# Patient Record
Sex: FEMALE | Race: BLACK OR AFRICAN AMERICAN
[De-identification: names, ages, dates, MRNs, and addresses within clinical notes are randomized per-mention and may not be internally consistent; named-entity substitution may affect disease eponyms.]

---

## 2019-04-30 ENCOUNTER — HOSPITAL ENCOUNTER (EMERGENCY)
Dept: HOSPITAL 72 - EMR | Age: 19
Discharge: HOME | End: 2019-04-30
Payer: COMMERCIAL

## 2019-04-30 VITALS — DIASTOLIC BLOOD PRESSURE: 71 MMHG | SYSTOLIC BLOOD PRESSURE: 117 MMHG

## 2019-04-30 VITALS — BODY MASS INDEX: 30.36 KG/M2 | WEIGHT: 165 LBS | HEIGHT: 62 IN

## 2019-04-30 DIAGNOSIS — Z20.2: Primary | ICD-10-CM

## 2019-04-30 LAB
APPEARANCE UR: CLEAR
APTT PPP: YELLOW S
GLUCOSE UR STRIP-MCNC: NEGATIVE MG/DL
KETONES UR QL STRIP: NEGATIVE
LEUKOCYTE ESTERASE UR QL STRIP: (no result)
NITRITE UR QL STRIP: NEGATIVE
PH UR STRIP: 6.5 [PH] (ref 4.5–8)
PROT UR QL STRIP: (no result)
SP GR UR STRIP: 1.02 (ref 1–1.03)
UROBILINOGEN UR-MCNC: 1 MG/DL (ref 0–1)

## 2019-04-30 PROCEDURE — 81003 URINALYSIS AUTO W/O SCOPE: CPT

## 2019-04-30 PROCEDURE — 81025 URINE PREGNANCY TEST: CPT

## 2019-04-30 PROCEDURE — 87590 N.GONORRHOEAE DNA DIR PROB: CPT

## 2019-04-30 PROCEDURE — 99284 EMERGENCY DEPT VISIT MOD MDM: CPT

## 2019-04-30 PROCEDURE — 87491 CHLMYD TRACH DNA AMP PROBE: CPT

## 2019-04-30 PROCEDURE — 87086 URINE CULTURE/COLONY COUNT: CPT

## 2019-04-30 PROCEDURE — 96374 THER/PROPH/DIAG INJ IV PUSH: CPT

## 2019-04-30 PROCEDURE — 96372 THER/PROPH/DIAG INJ SC/IM: CPT

## 2019-04-30 NOTE — EMERGENCY ROOM REPORT
History of Present Illness


General


Chief Complaint:  General Complaint


Source:  Patient





Present Illness


HPI


17-year-old female presents to the emergency department for closure/contact 

with venereal disease.  Patient reports that her boyfriend is currently being 

treated for STI.  Patient denies vaginal discharge, dysuria, hematuria, pain 

with intercourse, suspicion of pregnancy, fevers, chills, abdominal pain, 

swollen tender lymph nodes or focal joints.  She denies past history of STI's.  

No aggravating or relieving factors.


Allergies:  


Coded Allergies:  


     No Known Allergies (Unverified , 4/30/19)





Patient History


Past Medical History:  see triage record


Past Surgical History:  none


Pertinent Family History:  none


Pregnant Now:  No


Reviewed Nursing Documentation:  PMH: Agreed; PSxH: Agreed





Nursing Documentation-PMH


Past Medical History:  No Stated History





Review of Systems


All Other Systems:  negative except mentioned in HPI





Physical Exam





Vital Signs








  Date Time  Temp Pulse Resp B/P (MAP) Pulse Ox O2 Delivery O2 Flow Rate FiO2


 


4/30/19 19:11 98.1 67 16 114/72 97 Room Air  








Sp02 EP Interpretation:  reviewed, normal


General Appearance:  no apparent distress, alert, GCS 15, non-toxic


Head:  normocephalic, atraumatic


Eyes:  bilateral eye normal inspection, bilateral eye PERRL


ENT:  hearing grossly normal, normal voice


Neck:  full range of motion


Respiratory:  lungs clear, normal breath sounds, speaking full sentences


Cardiovascular #1:  regular rate, rhythm


Gastrointestinal:  normal bowel sounds, non tender, soft, non-distended


Genitourinary:  normal inspection, no CVA tenderness, deferred - pevic deferred


Musculoskeletal:  back normal, gait/station normal, normal range of motion, non-

tender


Neurologic:  alert, oriented x3, responsive, motor strength/tone normal, 

sensory intact, speech normal, grossly normal


Psychiatric:  judgement/insight normal


Skin:  normal color, no rash, warm/dry, well hydrated


Lymphatic:  no adenopathy





Medical Decision Making


PA Attestation


Dr. Boyle is my supervising Physician whom patient management has been 

discussed with.


Diagnostic Impression:  


 Primary Impression:  


 Contact with or exposure to venereal diseases


ER Course


17-year-old female presents to the emergency department for closure/contact 

with venereal disease.  Patient reports that her boyfriend is currently being 

treated for STI.  Patient denies vaginal discharge, dysuria, hematuria, pain 

with intercourse, suspicion of pregnancy, fevers, chills, abdominal pain, 

swollen tender lymph nodes or focal joints.  She denies past history of STI's.  

No aggravating or relieving factors.





Ddx considered but are not limited to UTi , Pyelo, STI, Stone, Cystitis, 

vaginal laceration, vaginitis.


Vital signs: are WNL, pt. is afebrile 





H& PE are most consistent with:  Vaginitis 


 


ORDERS:


- UA labs are attached  -- elevated inflammatory markers and presence of 

moderate bacteria consistent with UTI. --  C& S=  No growth. 








ED INTERVENTIONS:





DISCHARGE: At this time pt. is stable for d/c to home. Will provide printed 

patient care instructions, and any necessary prescriptions. Care plan and 

follow up instructions have been discussed with the patient prior to discharge. 

discussed with the patient prior to discharge.





Labs








Test


  4/30/19


19:47


 


Urine Color Yellow 


 


Urine Appearance Clear 


 


Urine pH 6.5 (4.5-8.0) 


 


Urine Specific Gravity


  1.020


(1.005-1.035)


 


Urine Protein 1+ (NEGATIVE) 


 


Urine Glucose (UA)


  Negative


(NEGATIVE)


 


Urine Ketones


  Negative


(NEGATIVE)


 


Urine Blood 2+ (NEGATIVE) 


 


Urine Nitrite


  Negative


(NEGATIVE)


 


Urine Bilirubin


  Negative


(NEGATIVE)


 


Urine Urobilinogen


  1 MG/DL


(0.0-1.0)


 


Urine Leukocyte Esterase 3+ (NEGATIVE) 


 


Urine RBC


  5-10 /HPF (0 -


2)


 


Urine WBC


  10-15 /HPF (0


- 2)


 


Urine Squamous Epithelial


Cells Moderate /LPF


(NONE/OCC)


 


Urine Bacteria


  Moderate /HPF


(NONE)


 


Urine HCG, Qualitative


  Negative


(NEGATIVE)











Last Vital Signs








  Date Time  Temp Pulse Resp B/P (MAP) Pulse Ox O2 Delivery O2 Flow Rate FiO2


 


4/30/19 19:11 98.1 67 16 114/72 97 Room Air  








Status:  improved


Disposition:  HOME, SELF-CARE


Condition:  Stable


Patient Instructions:  Chlamydia, Female, Easy-to-Read, Gonorrhea





Additional Instructions:  


Take medications as directed. 





 ** Follow up with a Primary Care Provider in 3-5 days, even if your symptoms 

have resolved. ** 


--Please review list of primary care clinics, if you do not already have a 

primary care provider





Return sooner to ED if new symptoms occur, or current symptoms become worse. 








- Please note that this Emergency Department Report was dictated using "[employer]" technology software, occasionally this can lead to 

erroneous entry secondary to interpretation by the dictation equipment.











Lakshmi Stroud Apr 30, 2019 20:13

## 2019-04-30 NOTE — NUR
ER DISCHARGE NOTE:

Patient is cleared to be discharged per Lakshmi Stroud/ PA. Pt is aox4 on room 
air with stable vital signs. Pt was given dc and prescription instructions and 
was able to verbalize understanding.

 Pt's ID band removed. pt is d/c from ED with steady gait and pt took all 
belongings.

## 2019-04-30 NOTE — NUR
ED Nurse Note:

 Pt arrived ED from home, c/o to R/o Sex trasmitted disease. Pt is A/O X4. 
Vital signs stable at this time, waiting for orders.

## 2020-06-22 ENCOUNTER — HOSPITAL ENCOUNTER (EMERGENCY)
Dept: HOSPITAL 72 - EMR | Age: 20
Discharge: HOME | End: 2020-06-22
Payer: MEDICAID

## 2020-06-22 VITALS — DIASTOLIC BLOOD PRESSURE: 76 MMHG | SYSTOLIC BLOOD PRESSURE: 127 MMHG

## 2020-06-22 VITALS — DIASTOLIC BLOOD PRESSURE: 70 MMHG | SYSTOLIC BLOOD PRESSURE: 135 MMHG

## 2020-06-22 VITALS — HEIGHT: 63 IN | WEIGHT: 148 LBS | BODY MASS INDEX: 26.22 KG/M2

## 2020-06-22 DIAGNOSIS — F12.90: ICD-10-CM

## 2020-06-22 DIAGNOSIS — O21.9: ICD-10-CM

## 2020-06-22 DIAGNOSIS — R10.9: ICD-10-CM

## 2020-06-22 DIAGNOSIS — O23.42: Primary | ICD-10-CM

## 2020-06-22 DIAGNOSIS — Z3A.14: ICD-10-CM

## 2020-06-22 LAB
ADD MANUAL DIFF: NO
ALBUMIN SERPL-MCNC: 3.6 G/DL (ref 3.4–5)
ALBUMIN/GLOB SERPL: 1 {RATIO} (ref 1–2.7)
ALP SERPL-CCNC: 74 U/L (ref 46–116)
ALT SERPL-CCNC: 15 U/L (ref 12–78)
ANION GAP SERPL CALC-SCNC: 9 MMOL/L (ref 5–15)
APPEARANCE UR: (no result)
APTT PPP: (no result) S
AST SERPL-CCNC: 14 U/L (ref 15–37)
BASOPHILS NFR BLD AUTO: 1 % (ref 0–2)
BILIRUB SERPL-MCNC: 0.1 MG/DL (ref 0.2–1)
BUN SERPL-MCNC: 8 MG/DL (ref 7–18)
CALCIUM SERPL-MCNC: 8.7 MG/DL (ref 8.5–10.1)
CHLORIDE SERPL-SCNC: 102 MMOL/L (ref 98–107)
CO2 SERPL-SCNC: 23 MMOL/L (ref 21–32)
CREAT SERPL-MCNC: 0.6 MG/DL (ref 0.55–1.3)
EOSINOPHIL NFR BLD AUTO: 0.7 % (ref 0–3)
ERYTHROCYTE [DISTWIDTH] IN BLOOD BY AUTOMATED COUNT: 12.8 % (ref 11.6–14.8)
GLOBULIN SER-MCNC: 3.6 G/DL
GLUCOSE UR STRIP-MCNC: NEGATIVE MG/DL
HCT VFR BLD CALC: 37.1 % (ref 37–47)
HGB BLD-MCNC: 12.1 G/DL (ref 12–16)
INR PPP: 1 (ref 0.9–1.1)
KETONES UR QL STRIP: NEGATIVE
LEUKOCYTE ESTERASE UR QL STRIP: (no result)
LYMPHOCYTES NFR BLD AUTO: 16.8 % (ref 20–45)
MCV RBC AUTO: 92 FL (ref 80–99)
MONOCYTES NFR BLD AUTO: 5.6 % (ref 1–10)
NEUTROPHILS NFR BLD AUTO: 76 % (ref 45–75)
NITRITE UR QL STRIP: NEGATIVE
PH UR STRIP: 7 [PH] (ref 4.5–8)
PLATELET # BLD: 239 K/UL (ref 150–450)
POTASSIUM SERPL-SCNC: 3.4 MMOL/L (ref 3.5–5.1)
PROT UR QL STRIP: (no result)
RBC # BLD AUTO: 4.04 M/UL (ref 4.2–5.4)
SODIUM SERPL-SCNC: 134 MMOL/L (ref 136–145)
SP GR UR STRIP: 1.02 (ref 1–1.03)
UROBILINOGEN UR-MCNC: NORMAL MG/DL (ref 0–1)
WBC # BLD AUTO: 13.6 K/UL (ref 4.8–10.8)

## 2020-06-22 PROCEDURE — 96374 THER/PROPH/DIAG INJ IV PUSH: CPT

## 2020-06-22 PROCEDURE — 81025 URINE PREGNANCY TEST: CPT

## 2020-06-22 PROCEDURE — 83690 ASSAY OF LIPASE: CPT

## 2020-06-22 PROCEDURE — 93005 ELECTROCARDIOGRAM TRACING: CPT

## 2020-06-22 PROCEDURE — 76817 TRANSVAGINAL US OBSTETRIC: CPT

## 2020-06-22 PROCEDURE — 85025 COMPLETE CBC W/AUTO DIFF WBC: CPT

## 2020-06-22 PROCEDURE — 36415 COLL VENOUS BLD VENIPUNCTURE: CPT

## 2020-06-22 PROCEDURE — 96361 HYDRATE IV INFUSION ADD-ON: CPT

## 2020-06-22 PROCEDURE — 84702 CHORIONIC GONADOTROPIN TEST: CPT

## 2020-06-22 PROCEDURE — 99284 EMERGENCY DEPT VISIT MOD MDM: CPT

## 2020-06-22 PROCEDURE — 80053 COMPREHEN METABOLIC PANEL: CPT

## 2020-06-22 PROCEDURE — 81003 URINALYSIS AUTO W/O SCOPE: CPT

## 2020-06-22 PROCEDURE — 71045 X-RAY EXAM CHEST 1 VIEW: CPT

## 2020-06-22 PROCEDURE — 87086 URINE CULTURE/COLONY COUNT: CPT

## 2020-06-22 PROCEDURE — 85610 PROTHROMBIN TIME: CPT

## 2020-06-22 PROCEDURE — 96375 TX/PRO/DX INJ NEW DRUG ADDON: CPT

## 2020-06-22 PROCEDURE — 76801 OB US < 14 WKS SINGLE FETUS: CPT

## 2020-06-22 NOTE — DIAGNOSTIC IMAGING REPORT
EXAM:

  US First Trimester Pregnancy, Transabdominal and Transvaginal

 

CLINICAL HISTORY:

  PAIN

 

TECHNIQUE:

  Real-time transabdominal and transvaginal obstetrical ultrasound of the 

maternal pelvis and a first trimester pregnancy with image documentation. 

 Transvaginal imaging was used for better evaluation of the fetus and 

adnexa.

 

COMPARISON:

  No relevant prior studies available.

 

FINDINGS:

  Gestation:  Ultrasound dates and estimated gestational age using LMP 

appear to be potentially discrepant, or this fetus could be 

constitutionally small in size.  Recommend attention on follow-up.  

Estimated gestational age using LMP 14 weeks 3 days.  Estimated 

gestational age using ultrasound biometrics 14 weeks 0 days  Fetal heart 

rate 154 bpm

  SID:  Estimated SID using LMP 12/18/2020  Estimated SID using 

ultrasound biometrics 12/21/2020

  EFW:  Not calculated

  BPD:  26 mm, 1 percentile, 14 weeks 3 days

  HC:  93 mm, 2 percentile 14 weeks 1 day

  AC:  75 mm, 24 percentile, 14 weeks 0 days

  FL:  11 mm, 13 weeks 2 days

  Placenta/amniotic fluid:  Cannot be adequately evaluated due to the 

early gestational age.

  Uterus/cervix:  Cervix long and closed.  Cervix 3.2cm, long and closed. 

 No myometrial mass.

  Ovaries: Unremarkable.  No mass.

  Free fluid:  No free fluid.

  Other findings:  No acute abnormality identified.

 

IMPRESSION:     

1.  Single viable intrauterine gestation.

2.  No acute abnormality identified.

3.  Cervix long and closed.

4.  Ultrasound dates and estimated gestational age using LMP appear to be 

potentially discrepant, or this fetus could be constitutionally small in 

size.  Recommend attention on follow-up.

## 2020-06-22 NOTE — EMERGENCY ROOM REPORT
History of Present Illness


General


Chief Complaint:  Abdominal Pain


Source:  Patient





Present Illness


HPI


19-year-old female with no significant past medical history here complaining of 

3 weeks of nausea, vomiting, diffuse abdominal pain and few bouts of nonbloody 

diarrhea.  Patient reports that her last menstrual period was 2019.  

Patient reports that she has been sexually active since.  Denies any vaginal 

bleeding or spotting.  Denies any urinary frequency and urgency.  Complains of 

intermittent dizziness in the past 3 weeks.  Denies any syncope and head 

injury.  Denies chest pain, shortness of breath, headache .  Has not taken 

medication for symptom relief.  Reports that she smokes marijuana daily basis.  

Denies tobacco smoke, all other drug use and alcohol intake.  Patient denies 

being pregnant in the past.


Allergies:  


Coded Allergies:  


     No Known Allergies (Unverified , 19)





COVID-19 Screening


Contact w/high risk pt:  No


Recent Travel to affected area:  No


Experienced COVID-19 symptoms?:  No


COVID-19 Testing performed PTA:  No





Patient History


Past Medical History:  see triage record


Past Surgical History:  none


Pertinent Family History:  none


Pregnant Now:  No


Immunizations:  UTD


Reviewed Nursing Documentation:  PMH: Agreed; PSxH: Agreed





Nursing Documentation-PMH


Past Medical History:  No Stated History





Review of Systems


All Other Systems:  negative except mentioned in HPI





Physical Exam





Vital Signs








  Date Time  Temp Pulse Resp B/P (MAP) Pulse Ox O2 Delivery O2 Flow Rate FiO2


 


20 17:09 98.2 75 16 123/74 (90) 98 Room Air  








Sp02 EP Interpretation:  reviewed, normal


General Appearance:  no apparent distress, alert, GCS 15, non-toxic


Head:  normocephalic, atraumatic


Eyes:  bilateral eye normal inspection, bilateral eye PERRL


ENT:  hearing grossly normal, normal pharynx, no angioedema, normal voice


Neck:  full range of motion, supple/symm/no masses


Respiratory:  chest non-tender, lungs clear, no rhonchi, no respiratory distress

, no retraction, no wheezing


Cardiovascular #1:  regular rate, rhythm, no edema


Gastrointestinal:  non tender, soft, no mass, no peritonitis, no bruit, no 

guarding, no rebound


Rectal:  deferred


Genitourinary:  no CVA tenderness


Musculoskeletal:  back normal, normal range of motion, no calf tenderness, gait/

station normal, non-tender


Neurologic:  alert, motor strength/tone normal, oriented x3, sensory intact, 

responsive, speech normal


Psychiatric:  judgement/insight normal, memory normal, mood/affect normal, no 

suicidal/homicidal ideation


Skin:  no rash


Lymphatic:  no adenopathy





Medical Decision Making


PA Attestation


All diagnoses and treatment plans were reviewed and discussed with my 

supervising physician Dr. Rojas


Diagnostic Impression:  


 Primary Impression:  


 Abdominal pain during pregnancy


 Additional Impressions:  


 UTI (urinary tract infection) during pregnancy


 Nausea


ER Course


19-year-old female with no significant past medical history here complaining of 

3 weeks of nausea, vomiting, diffuse abdominal pain and few bouts of nonbloody 

diarrhea.  Patient reports that her last menstrual period was 2019.  

Patient reports that she has been sexually active since.  Denies any vaginal 

bleeding or spotting.  Denies any urinary frequency and urgency.  Complains of 

intermittent dizziness in the past 3 weeks.  Denies any syncope and head 

injury.  Denies chest pain, shortness of breath, headache .  Has not taken 

medication for symptom relief.  Reports that she smokes marijuana daily basis.  

Denies tobacco smoke, all other drug use and alcohol intake.  Patient denies 

being pregnant in the past.





Ddx considered but are not limited to: UTI, pyelonephritis, appendicitis, 

threatened , spontaneous , intrauterine pregnancy








Vital signs: are WNL, pt. is afebrile








 H&PE are most consistent with: Abdominal pain during pregnancy, UTI urine 

pregnancy, Diclegis, Keflex, prenatal vitamins, Tylenol














ORDERS: UA, urine cx, urine pregnancy test, beta-hCG, CBC, CMP, abdominal 

ultrasound














ED INTERVENTIONS: NS bolus, Zofran, dicyclomine,




















DISCHARGE: At this time pt. is stable for d/c to home. Will provide printed 

patient care instructions, and any necessary prescriptions. Care plan and 

follow up instructions have been discussed with the patient prior to discharge.

  Take medication as directed, follow-up primary doctor, follow-up with OB/GYN 

clinic reported hours, if worsening symptoms return to the emergency room


CT/MRI/US Diagnostic Results


CT/MRI/US Diagnostic Results :  


   Imaging Test Ordered:  OB US


   Impression


FINDINGS:


Gestation: Ultrasound dates and estimated gestational age using LMP appear to 

be potentially discrepant, or this fetus could be constitutionally small in 

size. Recommend attention on follow-up. Estimated gestational age using LMP 14 

weeks 3 days. Estimated gestational age using ultrasound biometrics 14 weeks 0 

days Fetal heart rate 154 bpm


SID: Estimated SID using LMP 2020 Estimated SID using ultrasound 

biometrics 2020


EFW: Not calculated


BPD: 26 mm, 1 percentile, 14 weeks 3 days


HC: 93 mm, 2 percentile 14 weeks 1 day


AC: 75 mm, 24 percentile, 14 weeks 0 days


FL: 11 mm, 13 weeks 2 days


Placenta/amniotic fluid: Cannot be adequately evaluated due to the early 

gestational age.


Uterus/cervix: Cervix long and closed. Cervix 3.2cm, long and closed. No 

myometrial mass.


Ovaries: Unremarkable. No mass.


Free fluid: No free fluid.


Other findings: No acute abnormality identified.





IMPRESSION:


1. Single viable intrauterine gestation.


2. No acute abnormality identified.


3. Cervix long and closed.


4. Ultrasound dates and estimated gestational age using LMP appear to be 

potentially discrepant, or this fetus could be constitutionally small in size. 

Recommend attention on follow-up.





Last Vital Signs








  Date Time  Temp Pulse Resp B/P (MAP) Pulse Ox O2 Delivery O2 Flow Rate FiO2


 


20 17:19  75 16   Room Air  


 


20 17:19 98.2   135/70 99   








Disposition:  HOME, SELF-CARE


Condition:  Stable


Scripts


Prenatal #103/Iron Fumarate/Fa ( PRENATAL TABLET) 1 Each Tablet


1 EACH PO DAILY, #30 TAB


   Prov: Anjelica Pepe         20 


Acetaminophen* (TYLENOL EXTRA STRENGTH*) 500 Mg Tablet


500 MG ORAL Q8H PRN for Prn Headache/Temp > 101, #30 TAB 0 Refills


   Prov: Anjelica Pepe         20 


Doxylamine/Pyridoxine Hcl (DICLEGIS DR 10-10 MG TABLET) 1 Each Tablet.


1 EACH PO DAILY, #14 TAB


   Prov: Anjelica Pepe         20 


Cephalexin* (KEFLEX*) 500 Mg Capsule


500 MG ORAL EVERY 12 HOURS for 7 Days, #14 CAP 0 Refills


   Prov: Anjelica Pepe         20


Patient Instructions:  Abdominal Pain During Pregnancy, Urinary Tract Infection

, Easy-to-Read





Additional Instructions:  


Take medication as directed, follow-up with your OB/GYN in 24 to 48 hours, 

increase oral hydration, avoid smoking marijuana, avoid drinking alcohol, if 

worsening symptoms return to the emergency room











Anjelica Pepe 2020 19:34

## 2020-06-22 NOTE — NUR
ED Nurse Note:



Pt from home came in due to RLQ abd pain and dizziness x 3 weeks. Pt states 
that her last LMP was last march and she is unsure if she is pregnant. Also c/o 
that she is irregular on her period. AAO x4, ambulatory with non labored 
breathing. No active vomiting upon ED arrival. Calm and cooperative.

## 2020-06-22 NOTE — NUR
ER DISCHARGE NOTE:

Patient is cleared to be discharged homeper SONNY, pt is aox4, 99% on room air, 
with stable vital signs. pt was given dc and prescription instructions, pt was 
able to verbalize understanding, pt id band and iv site removed without 
complications. pt is able to ambulate with steady gait. pt took all belongings.

## 2020-06-23 NOTE — DIAGNOSTIC IMAGING REPORT
Procedure: XRAY Chest 1v

Reason for study: Reason For Exam: ABD PAIN

 

Comparison films:  None.

 

FINDINGS:

 

A single one view chest is obtained. Vascularity is normal. The lung fields are clear

bilaterally. Cardiac and mediastinal silhouette are within normal limits. CP angles

are sharp.  The bony thorax appear unremarkable.

 

IMPRESSION:  

 

NO ACUTE CARDIOPULMONARY DISEASE.

## 2020-08-30 ENCOUNTER — HOSPITAL ENCOUNTER (EMERGENCY)
Dept: HOSPITAL 72 - EMR | Age: 20
Discharge: HOME | End: 2020-08-30
Payer: COMMERCIAL

## 2020-08-30 VITALS — SYSTOLIC BLOOD PRESSURE: 117 MMHG | DIASTOLIC BLOOD PRESSURE: 69 MMHG

## 2020-08-30 VITALS — BODY MASS INDEX: 29.81 KG/M2 | WEIGHT: 162 LBS | HEIGHT: 62 IN

## 2020-08-30 DIAGNOSIS — O21.9: ICD-10-CM

## 2020-08-30 DIAGNOSIS — O23.12: ICD-10-CM

## 2020-08-30 DIAGNOSIS — N30.00: ICD-10-CM

## 2020-08-30 DIAGNOSIS — O26.892: Primary | ICD-10-CM

## 2020-08-30 DIAGNOSIS — R10.30: ICD-10-CM

## 2020-08-30 DIAGNOSIS — Z3A.23: ICD-10-CM

## 2020-08-30 LAB
ADD MANUAL DIFF: NO
ALBUMIN SERPL-MCNC: 3.2 G/DL (ref 3.4–5)
ALBUMIN/GLOB SERPL: 0.8 {RATIO} (ref 1–2.7)
ALP SERPL-CCNC: 85 U/L (ref 46–116)
ALT SERPL-CCNC: 15 U/L (ref 12–78)
ANION GAP SERPL CALC-SCNC: 8 MMOL/L (ref 5–15)
APPEARANCE UR: (no result)
APTT PPP: (no result) S
AST SERPL-CCNC: 16 U/L (ref 15–37)
BASOPHILS NFR BLD AUTO: 0.5 % (ref 0–2)
BILIRUB SERPL-MCNC: 0.1 MG/DL (ref 0.2–1)
BUN SERPL-MCNC: 6 MG/DL (ref 7–18)
CALCIUM SERPL-MCNC: 9.6 MG/DL (ref 8.5–10.1)
CHLORIDE SERPL-SCNC: 103 MMOL/L (ref 98–107)
CO2 SERPL-SCNC: 25 MMOL/L (ref 21–32)
CREAT SERPL-MCNC: 0.6 MG/DL (ref 0.55–1.3)
EOSINOPHIL NFR BLD AUTO: 0.9 % (ref 0–3)
ERYTHROCYTE [DISTWIDTH] IN BLOOD BY AUTOMATED COUNT: 12.5 % (ref 11.6–14.8)
GLOBULIN SER-MCNC: 3.9 G/DL
GLUCOSE UR STRIP-MCNC: NEGATIVE MG/DL
HCT VFR BLD CALC: 33.7 % (ref 37–47)
HGB BLD-MCNC: 11.3 G/DL (ref 12–16)
KETONES UR QL STRIP: NEGATIVE
LEUKOCYTE ESTERASE UR QL STRIP: (no result)
LYMPHOCYTES NFR BLD AUTO: 17.5 % (ref 20–45)
MCV RBC AUTO: 91 FL (ref 80–99)
MONOCYTES NFR BLD AUTO: 6.3 % (ref 1–10)
NEUTROPHILS NFR BLD AUTO: 74.8 % (ref 45–75)
NITRITE UR QL STRIP: NEGATIVE
PH UR STRIP: 8 [PH] (ref 4.5–8)
PLATELET # BLD: 248 K/UL (ref 150–450)
POTASSIUM SERPL-SCNC: 3.6 MMOL/L (ref 3.5–5.1)
PROT UR QL STRIP: NEGATIVE
RBC # BLD AUTO: 3.72 M/UL (ref 4.2–5.4)
SODIUM SERPL-SCNC: 136 MMOL/L (ref 136–145)
SP GR UR STRIP: 1.01 (ref 1–1.03)
UROBILINOGEN UR-MCNC: NORMAL MG/DL (ref 0–1)
WBC # BLD AUTO: 11.8 K/UL (ref 4.8–10.8)

## 2020-08-30 PROCEDURE — 96365 THER/PROPH/DIAG IV INF INIT: CPT

## 2020-08-30 PROCEDURE — 36415 COLL VENOUS BLD VENIPUNCTURE: CPT

## 2020-08-30 PROCEDURE — 87086 URINE CULTURE/COLONY COUNT: CPT

## 2020-08-30 PROCEDURE — 83690 ASSAY OF LIPASE: CPT

## 2020-08-30 PROCEDURE — 76805 OB US >/= 14 WKS SNGL FETUS: CPT

## 2020-08-30 PROCEDURE — 87210 SMEAR WET MOUNT SALINE/INK: CPT

## 2020-08-30 PROCEDURE — 99284 EMERGENCY DEPT VISIT MOD MDM: CPT

## 2020-08-30 PROCEDURE — 85025 COMPLETE CBC W/AUTO DIFF WBC: CPT

## 2020-08-30 PROCEDURE — 96361 HYDRATE IV INFUSION ADD-ON: CPT

## 2020-08-30 PROCEDURE — 81003 URINALYSIS AUTO W/O SCOPE: CPT

## 2020-08-30 PROCEDURE — 80053 COMPREHEN METABOLIC PANEL: CPT

## 2020-08-30 PROCEDURE — 76817 TRANSVAGINAL US OBSTETRIC: CPT

## 2020-08-30 PROCEDURE — 96375 TX/PRO/DX INJ NEW DRUG ADDON: CPT

## 2020-08-30 NOTE — EMERGENCY ROOM REPORT
History of Present Illness


General


Chief Complaint:  Abdominal Pain


Source:  Patient





Present Illness


HPI


Patient presents with lower abdominal pain vomiting since last night.  She is 6 

months pregnant.  She is due to deliver December 22.  She states she ate some 

bad food yesterday.  She and her significant other have similar symptoms.  Her 

pain is in her lower abdomen.  She denies cramping.  She rates the pain 8/10 

and fairly constant.  She has a discharge.  She also has foul-smelling urine 

and slight dysuria.  She vomited she vomited once last night.  She denies 

feeling nausea at this time.  She is not taken any medication for the pain at 

this time.  She denies any diarrhea.  There is no vaginal bleeding.  She denies 

fevers or chills.  She feels the baby kicking.  This does not cause increased 

pain.





No sore throat, chest pain, palpitations, diarrhea, shortness of breath, joint 

pain, rashes, depression, anxiety, visual changes, dizziness, headache.





She does have prenatal care.  She states that she is due to deliver at [facility].


Allergies:  


Coded Allergies:  


     No Known Allergies (Unverified , 4/30/19)





COVID-19 Screening


Contact w/high risk pt:  No


Recent Travel to affected area:  No


Experienced COVID-19 symptoms?:  No


COVID-19 Testing performed PTA:  No





Patient History


Past Medical History:  see triage record


Social History:  Denies: smoking


Social History Narrative


With significant other


Pregnant Now:  Yes - 6 months


Reviewed Nursing Documentation:  PMH: Agreed; PSxH: Agreed





Nursing Documentation-PMH


Past Medical History:  No History, Except For





Review of Systems


All Other Systems:  negative except mentioned in HPI





Physical Exam





Vital Signs








  Date Time  Temp Pulse Resp B/P (MAP) Pulse Ox O2 Delivery O2 Flow Rate FiO2


 


8/30/20 11:03 98.4 88 16 117/69 (85) 96 Room Air  








Sp02 EP Interpretation:  reviewed, normal


General Appearance:  well appearing, no apparent distress, GCS 15


Head:  normocephalic


Eyes:  bilateral eye normal inspection


ENT:  moist mucus membranes


Neck:  supple


Respiratory:  lungs clear, normal breath sounds


Cardiovascular #1:  regular rate, rhythm


Cardiovascular #2:  2+ radial (R)


Gastrointestinal:  normal inspection, normal bowel sounds, non-distended, no 

guarding - Reported suprapubic, no rebound, tenderness, other - No pain right 

lower quadrant, gravid uterus present


Genitourinary:  no CVA tenderness, ext genitalia/vag normal, other - Clear 

discharge present


Musculoskeletal:  back normal, normal range of motion, gait/station normal


Neurologic:  alert, oriented x3, grossly normal


Psychiatric:  mood/affect normal


Skin:  no rash, warm/dry





Medical Decision Making


Diagnostic Impression:  


 Primary Impression:  


 Abdominal pain


 Qualified Codes:  R10.30 - Lower abdominal pain, unspecified


 Additional Impressions:  


 Nausea and vomiting


 Qualified Codes:  R11.2 - Nausea with vomiting, unspecified


 6 months gestation


 UTI (urinary tract infection)


 Qualified Codes:  N30.00 - Acute cystitis without hematuria


ER Course


Patient who is 6 months pregnant presents with lower abdominal pain and 

vomiting.  Differential includes gastroenteritis, food poisoning, appendicitis, 

urinary tract infection, false labor, abruption amongst others.  Evaluation 

with ultrasound and labs.  Patient treated with Reglan, Benadryl and Tylenol.





Labs with slightly high white count with no left shift.  CMP unremarkable.  

Urinalysis with pyuria.  Ultrasound as reported below.  Wet mount no clue cells

, trichomonas or yeast.





Rocephin given for UTI.





Patient improved with treatment and pain resolved without vomiting or nausea.





Discussed results with patient.  Discussed treatment plan with patient.  

Patient advised to follow-up with her OB/GYN.





Patient stable for outpatient observation and treatment.





Laboratory Tests








Test


  8/30/20


11:48


 


White Blood Count


  11.8 K/UL


(4.8-10.8)  H


 


Red Blood Count


  3.72 M/UL


(4.20-5.40)  L


 


Hemoglobin


  11.3 G/DL


(12.0-16.0)  L


 


Hematocrit


  33.7 %


(37.0-47.0)  L


 


Mean Corpuscular Volume 91 FL (80-99)  


 


Mean Corpuscular Hemoglobin


  30.4 PG


(27.0-31.0)


 


Mean Corpuscular Hemoglobin


Concent 33.6 G/DL


(32.0-36.0)


 


Red Cell Distribution Width


  12.5 %


(11.6-14.8)


 


Platelet Count


  248 K/UL


(150-450)


 


Mean Platelet Volume


  6.2 FL


(6.5-10.1)  L


 


Neutrophils (%) (Auto)


  74.8 %


(45.0-75.0)


 


Lymphocytes (%) (Auto)


  17.5 %


(20.0-45.0)  L


 


Monocytes (%) (Auto)


  6.3 %


(1.0-10.0)


 


Eosinophils (%) (Auto)


  0.9 %


(0.0-3.0)


 


Basophils (%) (Auto)


  0.5 %


(0.0-2.0)


 


Urine Color Pale yellow  


 


Urine Appearance Cloudy  


 


Urine pH 8 (4.5-8.0)  


 


Urine Specific Gravity


  1.010


(1.005-1.035)


 


Urine Protein


  Negative


(NEGATIVE)


 


Urine Glucose (UA)


  Negative


(NEGATIVE)


 


Urine Ketones


  Negative


(NEGATIVE)


 


Urine Blood


  1+ (NEGATIVE)


H


 


Urine Nitrite


  Negative


(NEGATIVE)


 


Urine Bilirubin


  Negative


(NEGATIVE)


 


Urine Urobilinogen


  Normal MG/DL


(0.0-1.0)


 


Urine Leukocyte Esterase


  3+ (NEGATIVE)


H


 


Urine RBC


  2-4 /HPF (0 -


2)  H


 


Urine WBC


  5-10 /HPF (0 -


2)  H


 


Urine Squamous Epithelial


Cells Many /LPF


(NONE/OCC)  H


 


Urine Amorphous Sediment


  Moderate /LPF


(NONE)  H


 


Urine Bacteria


  Moderate /HPF


(NONE)  H


 


Sodium Level


  136 MMOL/L


(136-145)


 


Potassium Level


  3.6 MMOL/L


(3.5-5.1)


 


Chloride Level


  103 MMOL/L


()


 


Carbon Dioxide Level


  25 MMOL/L


(21-32)


 


Anion Gap


  8 mmol/L


(5-15)


 


Blood Urea Nitrogen


  6 mg/dL (7-18)


L


 


Creatinine


  0.6 MG/DL


(0.55-1.30)


 


Estimated Glomerular


Filtration Rate > 60 mL/min


(>60)


 


Glucose Level


  96 MG/DL


()


 


Calcium Level


  9.6 MG/DL


(8.5-10.1)


 


Total Bilirubin


  0.1 MG/DL


(0.2-1.0)  L


 


Aspartate Amino Transferase


(AST) 16 U/L (15-37)


 


 


Alanine Aminotransferase (ALT)


  15 U/L (12-78)


 


 


Alkaline Phosphatase


  85 U/L


()


 


Total Protein


  7.1 G/DL


(6.4-8.2)


 


Albumin


  3.2 G/DL


(3.4-5.0)  L


 


Globulin 3.9 g/dL  


 


Albumin/Globulin Ratio


  0.8 (1.0-2.7)


L


 


Lipase


  48 U/L


()  L








Microbiology








 Date/Time


Source Procedure


Growth Status


 


 


 8/30/20 13:16


Vaginal Wet Prep - Final Complete








CT/MRI/US Diagnostic Results


CT/MRI/US Diagnostic Results :  


   Imaging Test Ordered:  ultrasound


   Impression


Single intrauterine pregnancy with fetal heart rate of 153 bpm.  No acute 

abnormality.


Estimated fetal weight is 611 g +/-  89 g which is at the 15 percentile.





Last Vital Signs








  Date Time  Temp Pulse Resp B/P (MAP) Pulse Ox O2 Delivery O2 Flow Rate FiO2


 


8/30/20 14:29 98.4 85 16 117/69 96 Room Air  








Status:  improved


Disposition:  HOME, SELF-CARE


Condition:  Improved


Scripts


Nitrofurantoin Monohyd/M-Cryst* (MACROBID 100 MG*) 100 Mg Capsule


100 MG ORAL EVERY 12 HOURS, #14 CAP


   Prov: Thom Boyle MD         8/30/20 


Promethazine Hcl* (PHENERGAN*) 25 Mg Tablet


25 MG ORAL Q8HR PRN for Nausea & Vomiting, #10 TAB 0 Refills


   Prov: Thom Boyle MD         8/30/20 


Acetaminophen (Tylenol) 325 Mg Tablet


650 MG ORAL Q6H PRN for Prn Pain/Headache/Temp > 101, #20 TAB 0 Refills


   Prov: Thom Boyle MD         8/30/20











Thom Boyle MD Aug 30, 2020 11:24

## 2020-08-30 NOTE — DIAGNOSTIC IMAGING REPORT
EXAM:

  US Second or Third Trimester Pregnancy, Transabdominal

 

CLINICAL HISTORY:

  ABD PAIN

 

TECHNIQUE:

  Real-time transabdominal obstetrical ultrasound of the maternal pelvis 

and a second or third trimester pregnancy with image documentation.

 

COMPARISON:

  OB ultrasound on 6/22/2020

 

FINDINGS:

  Single live intrauterine gestation with cephalic presentation. The 

placenta is anterior fundal without evidence of previa. The amniotic 

fluid index is within normal limits with maximum vertical fluid pocket 

measuring 7.10 cm. The cervix is long and closed, measuring 3.5 cm.

 

  Patient's LMP is reported as 3/13/2020 which corresponds to gestational 

age of 24 weeks 2 days giving SID of 12/18/2020.

 

  BPD     5.68 cm     23 weeks 3  days

  HC      21.76  cm    23  weeks 6  days

  AC       18.57  cm    23  weeks 3  days

  FL         4.25 cm     23 weeks  6 days

 

  By this ultrasound, estimated gestational age is 23 weeks 4 days, 

corresponding to a sonographic SID of 12/23/2020.

 

  Estimated fetal weight is 611 g +/-  89 g which is at the 15 percentile.

 

  HC/AC ratio is 1.17 (normal range for this fetus is 1.05-1.21).

 

  FHR is 153 bpm.

 

  Visualized fetal anatomy is grossly unremarkable.

 

  Maternal right ovary measures 4.6 x 2.0 x 5.3 cm.  Normal appearance 

with normal color Doppler flow.

 

  Left ovary measures 4.6 x 2.7 x 4.8 cm.  Normal appearance with normal 

color Doppler flow.

 

IMPRESSION:   

  Single intrauterine pregnancy with fetal heart rate of 153 bpm.  No 

acute abnormality.

 

  Estimated fetal weight is 611 g +/-  89 g which is at the 15 percentile.

## 2020-08-30 NOTE — NUR
ED Nurse Note:



pt presents to ED c/o lower abd pain 8/10 and foul smelling vaginal discharge 
since last night. pt also reports one episode of vomiting. pt states she is 6 
months pregnant, denies any bleeding at this time, slightly nauseous. pt has no 
other complaints at this time

## 2022-07-15 ENCOUNTER — HOSPITAL ENCOUNTER (EMERGENCY)
Dept: HOSPITAL 87 - ER | Age: 22
Discharge: HOME | End: 2022-07-15
Payer: MEDICARE

## 2022-07-15 VITALS — SYSTOLIC BLOOD PRESSURE: 127 MMHG | DIASTOLIC BLOOD PRESSURE: 85 MMHG

## 2022-07-15 VITALS — HEIGHT: 62 IN | WEIGHT: 149.91 LBS | BODY MASS INDEX: 27.59 KG/M2

## 2022-07-15 DIAGNOSIS — H10.89: Primary | ICD-10-CM

## 2022-07-15 PROCEDURE — 99283 EMERGENCY DEPT VISIT LOW MDM: CPT
